# Patient Record
Sex: MALE | Race: WHITE | NOT HISPANIC OR LATINO | Employment: OTHER | URBAN - METROPOLITAN AREA
[De-identification: names, ages, dates, MRNs, and addresses within clinical notes are randomized per-mention and may not be internally consistent; named-entity substitution may affect disease eponyms.]

---

## 2018-12-21 PROBLEM — M48.07 LUMBOSACRAL STENOSIS: Status: ACTIVE | Noted: 2018-12-21

## 2019-05-02 PROBLEM — F17.200 TOBACCO USE DISORDER: Status: ACTIVE | Noted: 2019-05-02

## 2019-05-02 PROBLEM — M54.16 LUMBAR RADICULOPATHY: Status: ACTIVE | Noted: 2019-05-02

## 2019-12-04 PROBLEM — Z12.5 SCREENING FOR PROSTATE CANCER: Status: ACTIVE | Noted: 2019-12-04

## 2019-12-04 PROBLEM — Z00.00 WELL ADULT EXAM: Status: ACTIVE | Noted: 2019-12-04

## 2019-12-04 PROBLEM — Z79.899 ENCOUNTER FOR LONG-TERM (CURRENT) USE OF MEDICATIONS: Status: ACTIVE | Noted: 2019-12-04

## 2020-03-09 PROBLEM — Z00.00 WELL ADULT EXAM: Status: RESOLVED | Noted: 2019-12-04 | Resolved: 2020-03-09

## 2020-05-04 PROBLEM — E78.1 ABNORMALLY LOW HIGH DENSITY LIPOPROTEIN (HDL) CHOLESTEROL WITH HYPERTRIGLYCERIDEMIA: Status: ACTIVE | Noted: 2020-05-04

## 2020-05-04 PROBLEM — E78.6 ABNORMALLY LOW HIGH DENSITY LIPOPROTEIN (HDL) CHOLESTEROL WITH HYPERTRIGLYCERIDEMIA: Status: ACTIVE | Noted: 2020-05-04

## 2020-05-04 PROBLEM — Z00.00 WELL ADULT EXAM: Status: ACTIVE | Noted: 2020-05-04

## 2020-08-03 PROBLEM — Z00.00 WELL ADULT EXAM: Status: RESOLVED | Noted: 2020-05-04 | Resolved: 2020-08-03

## 2020-09-16 PROBLEM — M54.41 CHRONIC RIGHT-SIDED LOW BACK PAIN WITH RIGHT-SIDED SCIATICA: Status: ACTIVE | Noted: 2020-09-16

## 2020-09-16 PROBLEM — G89.29 CHRONIC RIGHT-SIDED LOW BACK PAIN WITH RIGHT-SIDED SCIATICA: Status: ACTIVE | Noted: 2020-09-16

## 2020-10-07 PROBLEM — Z82.49 FAMILY HISTORY OF HEART DISEASE: Status: ACTIVE | Noted: 2020-10-07

## 2020-10-07 PROBLEM — I21.11 STEMI INVOLVING RIGHT CORONARY ARTERY: Status: ACTIVE | Noted: 2020-10-07

## 2020-10-07 PROBLEM — R07.9 CHEST PAIN: Status: ACTIVE | Noted: 2020-10-07

## 2020-10-10 PROBLEM — I21.11 ST ELEVATION MYOCARDIAL INFARCTION INVOLVING RIGHT CORONARY ARTERY: Status: ACTIVE | Noted: 2020-10-10

## 2020-10-10 PROBLEM — R07.9 CHEST PAIN: Status: RESOLVED | Noted: 2020-10-07 | Resolved: 2020-10-10

## 2020-10-10 PROBLEM — I25.10 CORONARY ARTERY DISEASE DUE TO CALCIFIED CORONARY LESION: Status: ACTIVE | Noted: 2020-10-10

## 2020-10-10 PROBLEM — I25.84 CORONARY ARTERY DISEASE DUE TO CALCIFIED CORONARY LESION: Status: ACTIVE | Noted: 2020-10-10

## 2020-10-10 PROBLEM — Z95.5 S/P RIGHT CORONARY ARTERY (RCA) STENT PLACEMENT: Status: ACTIVE | Noted: 2020-10-10

## 2020-10-11 PROBLEM — I21.11 ST ELEVATION MYOCARDIAL INFARCTION INVOLVING RIGHT CORONARY ARTERY: Status: RESOLVED | Noted: 2020-10-10 | Resolved: 2020-10-11

## 2021-01-20 ENCOUNTER — IMMUNIZATION (OUTPATIENT)
Dept: FAMILY MEDICINE | Facility: CLINIC | Age: 71
End: 2021-01-20
Payer: MEDICARE

## 2021-01-20 DIAGNOSIS — Z23 NEED FOR VACCINATION: Primary | ICD-10-CM

## 2021-01-20 PROCEDURE — 91300 COVID-19, MRNA, LNP-S, PF, 30 MCG/0.3 ML DOSE VACCINE: CPT | Mod: PBBFAC,PO

## 2021-02-10 ENCOUNTER — IMMUNIZATION (OUTPATIENT)
Dept: FAMILY MEDICINE | Facility: CLINIC | Age: 71
End: 2021-02-10
Payer: MEDICARE

## 2021-02-10 DIAGNOSIS — Z23 NEED FOR VACCINATION: Primary | ICD-10-CM

## 2021-02-10 PROCEDURE — 91300 COVID-19, MRNA, LNP-S, PF, 30 MCG/0.3 ML DOSE VACCINE: CPT | Mod: PBBFAC,PO

## 2021-02-10 PROCEDURE — 0002A COVID-19, MRNA, LNP-S, PF, 30 MCG/0.3 ML DOSE VACCINE: CPT | Mod: PBBFAC,PO

## 2021-02-24 PROBLEM — E78.00 HYPERCHOLESTEROLEMIA: Status: ACTIVE | Noted: 2021-02-24

## 2021-03-23 PROBLEM — I25.2 HISTORY OF NON-ST ELEVATION MYOCARDIAL INFARCTION (NSTEMI): Status: ACTIVE | Noted: 2021-03-23

## 2021-03-23 PROBLEM — N52.01 ERECTILE DYSFUNCTION DUE TO ARTERIAL INSUFFICIENCY: Status: ACTIVE | Noted: 2021-03-23

## 2021-03-23 PROBLEM — E78.2 MIXED HYPERLIPIDEMIA: Status: ACTIVE | Noted: 2021-03-23

## 2021-09-30 ENCOUNTER — IMMUNIZATION (OUTPATIENT)
Dept: FAMILY MEDICINE | Facility: CLINIC | Age: 71
End: 2021-09-30
Payer: MEDICARE

## 2021-09-30 DIAGNOSIS — Z23 NEED FOR VACCINATION: Primary | ICD-10-CM

## 2021-09-30 PROCEDURE — 91300 COVID-19, MRNA, LNP-S, PF, 30 MCG/0.3 ML DOSE VACCINE: CPT | Mod: PBBFAC | Performed by: FAMILY MEDICINE

## 2021-09-30 PROCEDURE — 0003A COVID-19, MRNA, LNP-S, PF, 30 MCG/0.3 ML DOSE VACCINE: CPT | Mod: PBBFAC | Performed by: FAMILY MEDICINE

## 2021-10-29 ENCOUNTER — TELEPHONE (OUTPATIENT)
Dept: FAMILY MEDICINE | Facility: CLINIC | Age: 71
End: 2021-10-29
Payer: MEDICARE

## 2021-11-11 PROBLEM — Z00.00 ANNUAL PHYSICAL EXAM: Status: ACTIVE | Noted: 2021-11-11

## 2022-02-14 PROBLEM — Z00.00 ANNUAL PHYSICAL EXAM: Status: RESOLVED | Noted: 2021-11-11 | Resolved: 2022-02-14

## 2022-04-18 ENCOUNTER — IMMUNIZATION (OUTPATIENT)
Dept: FAMILY MEDICINE | Facility: CLINIC | Age: 72
End: 2022-04-18
Payer: MEDICARE

## 2022-04-18 DIAGNOSIS — Z23 NEED FOR VACCINATION: Primary | ICD-10-CM

## 2022-11-16 ENCOUNTER — IMMUNIZATION (OUTPATIENT)
Dept: FAMILY MEDICINE | Facility: CLINIC | Age: 72
End: 2022-11-16
Payer: MEDICARE

## 2022-11-16 DIAGNOSIS — Z23 NEED FOR VACCINATION: Primary | ICD-10-CM

## 2022-11-16 PROCEDURE — 91312 COVID-19, MRNA, LNP-S, BIVALENT BOOSTER, PF, 30 MCG/0.3 ML DOSE: CPT | Mod: S$GLB,,, | Performed by: FAMILY MEDICINE

## 2022-11-16 PROCEDURE — 91312 COVID-19, MRNA, LNP-S, BIVALENT BOOSTER, PF, 30 MCG/0.3 ML DOSE: ICD-10-PCS | Mod: S$GLB,,, | Performed by: FAMILY MEDICINE

## 2022-11-16 PROCEDURE — 0124A COVID-19, MRNA, LNP-S, BIVALENT BOOSTER, PF, 30 MCG/0.3 ML DOSE: CPT | Mod: PBBFAC | Performed by: FAMILY MEDICINE

## 2023-02-27 PROBLEM — Z00.00 ANNUAL PHYSICAL EXAM: Status: RESOLVED | Noted: 2021-11-11 | Resolved: 2023-02-27

## 2023-12-03 PROBLEM — Z95.5 S/P RIGHT CORONARY ARTERY (RCA) STENT PLACEMENT: Status: RESOLVED | Noted: 2020-10-10 | Resolved: 2023-12-03

## 2023-12-03 PROBLEM — I25.2 HISTORY OF NON-ST ELEVATION MYOCARDIAL INFARCTION (NSTEMI): Status: RESOLVED | Noted: 2021-03-23 | Resolved: 2023-12-03

## 2023-12-03 PROBLEM — E78.2 MIXED HYPERLIPIDEMIA: Status: RESOLVED | Noted: 2021-03-23 | Resolved: 2023-12-03

## 2023-12-03 PROBLEM — I25.84 CORONARY ARTERY DISEASE DUE TO CALCIFIED CORONARY LESION: Status: RESOLVED | Noted: 2020-10-10 | Resolved: 2023-12-03

## 2023-12-03 PROBLEM — I25.10 CORONARY ARTERY DISEASE DUE TO CALCIFIED CORONARY LESION: Status: RESOLVED | Noted: 2020-10-10 | Resolved: 2023-12-03

## 2024-03-04 PROBLEM — Z00.00 ANNUAL PHYSICAL EXAM: Status: RESOLVED | Noted: 2021-11-11 | Resolved: 2024-03-04

## 2024-07-09 PROBLEM — R05.3 CHRONIC COUGH: Status: ACTIVE | Noted: 2024-07-09

## 2024-10-09 ENCOUNTER — OFFICE VISIT (OUTPATIENT)
Dept: NEUROLOGY | Facility: CLINIC | Age: 74
End: 2024-10-09
Payer: MEDICARE

## 2024-10-09 VITALS — DIASTOLIC BLOOD PRESSURE: 82 MMHG | HEART RATE: 73 BPM | SYSTOLIC BLOOD PRESSURE: 143 MMHG

## 2024-10-09 DIAGNOSIS — R20.0 NUMBNESS: Primary | ICD-10-CM

## 2024-10-09 DIAGNOSIS — G62.9 NEUROPATHY: ICD-10-CM

## 2024-10-09 DIAGNOSIS — R74.8 ABNORMAL LEVELS OF OTHER SERUM ENZYMES: ICD-10-CM

## 2024-10-09 PROCEDURE — 3079F DIAST BP 80-89 MM HG: CPT | Mod: CPTII,S$GLB,, | Performed by: NURSE PRACTITIONER

## 2024-10-09 PROCEDURE — 99999 PR PBB SHADOW E&M-EST. PATIENT-LVL III: CPT | Mod: PBBFAC,,, | Performed by: NURSE PRACTITIONER

## 2024-10-09 PROCEDURE — 1160F RVW MEDS BY RX/DR IN RCRD: CPT | Mod: CPTII,S$GLB,, | Performed by: NURSE PRACTITIONER

## 2024-10-09 PROCEDURE — 1159F MED LIST DOCD IN RCRD: CPT | Mod: CPTII,S$GLB,, | Performed by: NURSE PRACTITIONER

## 2024-10-09 PROCEDURE — 99203 OFFICE O/P NEW LOW 30 MIN: CPT | Mod: S$GLB,,, | Performed by: NURSE PRACTITIONER

## 2024-10-09 PROCEDURE — 3077F SYST BP >= 140 MM HG: CPT | Mod: CPTII,S$GLB,, | Performed by: NURSE PRACTITIONER

## 2024-10-09 PROCEDURE — 1125F AMNT PAIN NOTED PAIN PRSNT: CPT | Mod: CPTII,S$GLB,, | Performed by: NURSE PRACTITIONER

## 2024-10-09 NOTE — PROGRESS NOTES
NEUROLOGY  Outpatient Consultation Visit     [facility]  [address], [place] [address]  [phone] (office) / [phone] (fax)    Patient Name:  Wagner Heredia  :  1950  MR #:  [id number]  Acct #:  [id number]    Date of Neurology Consult: 10/09/2024  Name of Provider: JOSE D Slater    Other Physicians:  Shawn Ross DO (Primary Care Physician); Shawn Ross DO (Referring)      Chief Complaint: Peripheral Neuropathy (BLE)      History of Present Illness (HPI):  Wagner Heredia is a left handed  74 y.o. male with a PMHX of h/o cervical fracture, right hand fx      Patient presents today for neuropathy. It is to both feet.     He fractured his cervical spine in  and has 3 lumbar surgeries. He has chronic left calf atrophy. About 3-4 years ago he began having itching sensation to the balls of both feet. This did resolve overtime after lumbar surgeries.   Currently he has numbness to the tops of both feet. This began about 1 year ago. He describes it as altered sensation. This is constant but varies in intensity.  He denies associated pain and the symptoms do not wake him up at night. Exacerbating factors includes wearing socks. There are no alleviating factors.     He denies a history diabetes, autoimmune disorders, viral/bacterial infections, cancer, bone marrow disorders, kidney/liver/thyroid disease. H denies exposure to toxic substances or poisons. He does have chronic neck and back pain but no specific MVA or falls.     He drinks 1 shot of whiskey once or twice per week. He does admit to drinking daily in his 20s but no longer does. He does smoke a pipe but does not inhale.                 Past Medical, Surgical, Family & Social History:   Past Medical History:   Diagnosis Date    H/O cervical fracture     c5,c6    Right hand fracture      Past Surgical History:   Procedure Laterality Date    BACK SURGERY      fracture C5 and C6     CORONARY ANGIOGRAPHY N/A 10/7/2020    Procedure: ANGIOGRAM, CORONARY ARTERY;  Surgeon: Alejandro Luu MD;  Location: [facility] CATH;  Service: Cardiovascular;  Laterality: N/A;    CORONARY ANGIOGRAPHY N/A 10/8/2020    Procedure: ANGIOGRAM, CORONARY ARTERY;  Surgeon: Alejandro Luu MD;  Location: [facility] CATH;  Service: Cardiovascular;  Laterality: N/A;    CORONARY ANGIOGRAPHY N/A 2/24/2021    Procedure: ANGIOGRAM, CORONARY ARTERY;  Surgeon: Alejandro Luu MD;  Location: [facility] CATH;  Service: Cardiovascular;  Laterality: N/A;    DECOMPRESSION OF LUMBAR SPINE USING MINIMALLY INVASIVE TECHNIQUE N/A 5/2/2019    Procedure: DECOMPRESSION, SPINE, LUMBAR, MINIMALLY INVASIVE L2-3; dura repair;  Surgeon: Jose Luis Dee MD;  Location: [facility] OR;  Service: Orthopedics;  Laterality: N/A;    INSERTION OF INTRA-AORTIC BALLOON ASSIST DEVICE  10/8/2020    Procedure: INSERTION, INTRA-AORTIC BALLOON PUMP;  Surgeon: Alejandro Luu MD;  [facility];  Service: Cardiovascular;;    INSERTION OF TEMPORARY PACEMAKER N/A 10/8/2020    Procedure: INSERTION, PACEMAKER, TEMPORARY;  Surgeon: Alejandro Luu MD;  [facility];  Service: Cardiovascular;  Laterality: N/A;    LAMINECTOMY USING MINIMALLY INVASIVE TECHNIQUE N/A 12/21/2018    Procedure: LAMINECTOMY, SPINE, MINIMALLY INVASIVE- L2-5;  Surgeon: Jose Luis Dee MD;  Location: [facility] OR;  Service: Orthopedics;  Laterality: N/A;    LEFT HEART CATHETERIZATION Left 10/9/2020    Procedure: Left heart cath - Relook Angio with removal of IABP.;  Surgeon: Alejandro Luu MD;  Location: [facility] CATH;  Service: Cardiovascular;  Laterality: Left;    LEFT HEART CATHETERIZATION Left 10/7/2020    Procedure: CATHETERIZATION, HEART, LEFT;  Surgeon: Alejandro Luu MD;  Location: [facility] CATH;  Service: Cardiovascular;  Laterality: Left;    NECK SURGERY      fractured verterae    PERCUTANEOUS TRANSLUMINAL BALLOON ANGIOPLASTY OF CORONARY ARTERY  10/8/2020    Procedure: Angioplasty-coronary;  Surgeon:  Alejandro Luu MD;  Location: [facility];  Service: Cardiovascular;;     Family History   Problem Relation Name Age of Onset    Heart disease Father      Thyroid disease Mother       Alcohol use:  reports current alcohol use.   (Of note, 0.6 oz = 1 beer or 6 oz = 10 beers).  Tobacco use:  reports that he has quit smoking. His smoking use included pipe. He has never used smokeless tobacco.  Street drug use:  reports no history of drug use.  Allergies: Codeine, Hydrocodone, and Penicillins.    Home Medications:     Current Outpatient Medications:     ascorbic acid, vitamin C, (VITAMIN C) 1000 MG tablet, Take 1,000 mg by mouth once daily. , Disp: , Rfl:     atorvastatin (LIPITOR) 80 MG tablet, Take 1 tablet (80 mg total) by mouth every evening., Disp: 90 tablet, Rfl: 4    GLUCOSA COULTER 2KCL/CHONDROITIN COULTER (GLUCOSAMINE-CHONDROITIN DS ORAL), Take 1,000 mg by mouth once daily. Hold per MD instructions, Disp: , Rfl:     multivitamin (THERAGRAN) per tablet, Take 1 tablet by mouth once daily. Hold morning of surgery, Disp: , Rfl:     prasugreL (EFFIENT) 10 mg Tab, TAKE 1 TABLET BY MOUTH DAILY AS DIRECTED (Patient taking differently: 5mg daily), Disp: 90 tablet, Rfl: 3    saw palmetto 160 MG capsule, Take 160 mg by mouth 2 (two) times daily. Hold per MD instructions, Disp: , Rfl:     UNABLE TO FIND, Medical Marijuana, Disp: , Rfl:     vitamin D (VITAMIN D3) 1000 units Tab, Take 5,000 Units by mouth., Disp: , Rfl:     Physical Examination:  BP (!) 143/82 (BP Location: Right arm, Patient Position: Sitting)   Pulse 73     GENERAL:  General appearance: Well, non-toxic appearing.  No apparent distress.  Neck: supple.    MENTAL STATUS:  Alertness, attention span & concentration: normal.  Language: normal.  Orientation to self, place & time:  normal.  Memory, recent & remote: normal.  Fund of knowledge: normal.      SPEECH:  Clear and fluent.  Follows complex commands.      CRANIAL NERVES:  Cranial Nerves II-XII were examined.  II -  Visual fields: normal.  III, IV, VI: PERRL, EOMI, No ptosis, No nystagmus.  V - Facial sensation: normal.  VII - Face symmetry & mobility: normal.  VIII - Hearing: normal  IX, X - Palate: mobile & midline.  XI - Shoulder shrug: normal.  XII - Tongue protrusion: normal.        GROSS MOTOR:  Gait & station: able to rise from chair with arms crossed over chest; antalgic gait with slight left sided limp  Tone: normal.  Abnormal movements: none.  Finger-nose: normal.  Rapid alternating movements: normal.  Pronator drift: normal      MUSCLE STRENGTH:   Hand grasp:   - right:5/5   - left:5/5    RIGHT    LEFT   5 Deltoids 5   5 Biceps 5   5 Triceps 5   5 Forearm.Pr. 5        5 Iliopsoas flex    5   5 Hip Abduct 5   5 Hip Adduct 5   5 Quads 5   5 Hams 5   5 Dorsiflex 5   5 Plantar Flex 5   Atrophy to LLE (chronic)    REFLEXES:    RIGHT Reflex   LEFT   2 Biceps 2   2 Brachiorad. 2        1+ Patellar 1+         SENSORY:  Light touch: Normal throughout.  Sharp touch: dul sensation to dorsum both feet  Vibration: decreased to bilateral pinky toes  Temperature: decreased to tops and bottoms of both feet   Joint Position: Normal throughout.  Proprioception: Intact      Diagnostic Data Reviewed:   I have personally reviewed provider notes, labs and imaging made available to me today.     Imaging:  N/a      Labs:  Lab Results   Component Value Date    WBC 8.17 05/11/2022    HGB 15.9 05/11/2022    HCT 48.1 05/11/2022     05/11/2022    MCV 97 05/11/2022    RDW 12.4 05/11/2022     Lab Results   Component Value Date     11/28/2023    K 4.6 11/28/2023     11/28/2023    CO2 28 11/28/2023    BUN 12 11/28/2023    CREATININE 1.11 11/28/2023     (H) 11/28/2023    CALCIUM 8.9 11/28/2023    MG 2.1 10/10/2020     Lab Results   Component Value Date    PROT 6.9 11/28/2023    ALBUMIN 3.9 11/28/2023    BILITOT 1.0 11/28/2023    AST 45 11/28/2023    ALKPHOS 108 11/28/2023    ALT 40 11/28/2023     Lab Results   Component  "Value Date    INR 1.3 10/08/2020     Lab Results   Component Value Date    CHOL 109 (L) 11/28/2023    HDL 40 11/28/2023    LDLCALC 51.6 (L) 11/28/2023    TRIG 87 11/28/2023    CHOLHDL 36.7 11/28/2023     Lab Results   Component Value Date    HGBA1C 5.4 12/04/2019      No results found for: "MKLGCBIE04"  No results found for: "FOLATE"  Lab Results   Component Value Date    TSH 1.460 10/04/2016     No results found for: "VITAMINB1"  Lab Results   Component Value Date    RPR Non-Reactive 04/11/2006     No results found for: "ROCKY"  No components found for: "HEPATITISCANTIBODY"  No components found for: "HIV 1/2 AG/AB"  No results found for: "CRP"  No components found for: "SEDIMENTATIONRATE"      Assessment and Plan:  Wagner Heredia is a 74 y.o. male.    Problem List Items Addressed This Visit          Neuro    Numbness - Primary    Current Assessment & Plan     Initially pt reported neuropathy-like symptoms to both feet (burning, tingling, numbness) ~ 3-4 years ago that resolved after having lumbar surgeries.   Currently, he reports numbness to dorsum of both feet that has been ongoing for the last year.   No associated pain  Neuro exam with intact strength; decreased pin prick to bilateral dorsum of foot. Proprioception intact  Etiology unclear  Obtain serologies and EMG/NCS  Since no associated pain, medication is not warranted. Pt does use essential oils.          Other Visit Diagnoses       Neuropathy        Abnormal levels of other serum enzymes                                Important to note, also  has a past medical history of H/O cervical fracture and Right hand fracture.            The patient will return to clinic in 1-2 months after testing        All questions were answered and patient is comfortable with the plan.       Thank you very much for the opportunity to assist in this patient's care.    If you have any questions or concerns, please do not hesitate to contact me at any time.    Sincerely, "     JOSE D Slater  [facility] [place] I spent a total of 44 minutes on the day of the visit.This includes face to face time and non-face to face time preparing to see the patient (eg, review of tests), Obtaining and/or reviewing separately obtained history, Documenting clinical information in the electronic or other health record, Independently interpreting resultsand communicating results to the patient/family/caregiver, or Care coordination.

## 2024-10-09 NOTE — ASSESSMENT & PLAN NOTE
Initially pt reported neuropathy-like symptoms to both feet (burning, tingling, numbness) ~ 3-4 years ago that resolved after having lumbar surgeries.   Currently, he reports numbness to dorsum of both feet that has been ongoing for the last year.   No associated pain  Neuro exam with intact strength; decreased pin prick to bilateral dorsum of foot. Proprioception intact  Etiology unclear  Obtain serologies and EMG/NCS  Since no associated pain, medication is not warranted. Pt does use essential oils.

## 2024-10-11 ENCOUNTER — IMMUNIZATION (OUTPATIENT)
Dept: FAMILY MEDICINE | Facility: CLINIC | Age: 74
End: 2024-10-11
Payer: MEDICARE

## 2024-10-11 DIAGNOSIS — Z23 NEED FOR VACCINATION: Primary | ICD-10-CM

## 2024-12-02 ENCOUNTER — PROCEDURE VISIT (OUTPATIENT)
Dept: NEUROLOGY | Facility: CLINIC | Age: 74
End: 2024-12-02
Payer: MEDICARE

## 2024-12-02 DIAGNOSIS — R20.0 NUMBNESS: ICD-10-CM

## 2024-12-02 DIAGNOSIS — G62.9 NEUROPATHY: ICD-10-CM

## 2024-12-02 DIAGNOSIS — M54.17 LUMBOSACRAL RADICULOPATHY: Primary | ICD-10-CM

## 2024-12-02 PROCEDURE — 95910 NRV CNDJ TEST 7-8 STUDIES: CPT | Mod: S$GLB,,, | Performed by: PSYCHIATRY & NEUROLOGY

## 2024-12-02 PROCEDURE — 95886 MUSC TEST DONE W/N TEST COMP: CPT | Mod: S$GLB,,, | Performed by: PSYCHIATRY & NEUROLOGY

## 2024-12-06 ENCOUNTER — TELEPHONE (OUTPATIENT)
Dept: NEUROLOGY | Facility: CLINIC | Age: 74
End: 2024-12-06
Payer: MEDICARE

## 2024-12-06 NOTE — TELEPHONE ENCOUNTER
Kandy Ruano, FNP  P [facility] Staff  Please call patient and let him know his vitamin B6 is very elevated. This can cause similar symptoms to that he's complaining of. If taking a vitamin with B6 in it I recommend he stop it for now and then can eventually continue it at every other day.

## 2024-12-09 PROBLEM — Z00.00 ANNUAL WELLNESS VISIT: Status: ACTIVE | Noted: 2024-12-09

## 2025-01-10 ENCOUNTER — TELEPHONE (OUTPATIENT)
Dept: NEUROLOGY | Facility: CLINIC | Age: 75
End: 2025-01-10
Payer: MEDICARE

## 2025-01-10 PROBLEM — R05.3 CHRONIC COUGH: Status: ACTIVE | Noted: 2025-01-10

## 2025-01-10 PROBLEM — J30.1 NON-SEASONAL ALLERGIC RHINITIS DUE TO POLLEN: Status: ACTIVE | Noted: 2025-01-10

## 2025-01-10 NOTE — TELEPHONE ENCOUNTER
Spoke with the pt, requests EMG results be uploaded for interpretation secure chat sent to Dr Elizabeth.

## 2025-01-10 NOTE — PROCEDURES
[facility]  [address]  [place] [address]  [phone]      Full Name: Wagner Heredia Gender: Male  Patient ID: [id number] YOB: 1950      Visit Date: 12/2/2024 3:05 PM  Age: 74 Years  Examining Physician: Shannon Elizabeth D.O., ABPN, AOBNP, ABEM   Referring Physician: CARRIE Brink   Technologist: NABIL Cook   Height: 5 feet 10 inch  History: Patient complains of numbness on the dorsum of both feet that travels up the lower legs.  He has low back pain that can travel down the left leg.  He has atrophy of the left calf muscle since a previous back surgery.  He had a minimally invasive laminectomy from L2-5 and minimally invasive decompression at L2-3 with meningocele repair.  He has been referred for an EMG of both lower extremities.  He is noted to be taking prasugrel 5 mg daily.      Sensory NCS      Nerve / Sites Rec. Site Onset Lat Peak Lat NP Amp Segments Distance Velocity Temp.     ms ms µV  cm m/s °C   L Sural - Ankle (Calf)      Calf Ankle 3.65 4.56 4.4 Calf - Ankle 14 38 30.9      Ref.   <=4.60 >=3.0 Ref.  >=40    R Sural - Ankle (Calf)      Calf Ankle 3.48 4.40 3.8 Calf - Ankle 14 40 30.9      Ref.   <=4.60 >=3.0 Ref.  >=40    L Superficial peroneal - Ankle      Lat leg Ankle 3.65 4.46 3.7 Lat leg - Ankle 12 33 30.9      Ref.   <=4.60 >=3.0 Ref.      R Superficial peroneal - Ankle      Lat leg Ankle 3.04 3.58 3.1 Lat leg - Ankle 12 39 30.9      Ref.   <=4.60 >=3.0 Ref.          Motor NCS      Nerve / Sites Muscle Latency Ref. Amplitude Ref. Amp % Duration Segments Distance Lat Diff Velocity Ref. Temp.     ms ms mV mV % ms  cm ms m/s m/s °C   L Peroneal - EDB      Ankle EDB 5.23 <=6.00 2.7 >=2.5 100 5.83 Ankle - EDB     30.9      Fib head EDB 12.73  2.2  81.3 6.04 Fib head - Ankle 31 7.50 41 >=40 30.9      Pop fossa EDB 15.21  2.2  82.3 5.94 Pop fossa - Fib head 10 2.48 40  30.9   R Peroneal - EDB       Ankle EDB 4.90 <=6.00 3.8 >=2.5 100 5.19 Ankle - EDB     30.9      Fib head EDB 11.65  3.3  86.6 5.81 Fib head - Ankle 28 6.75 41 >=40 30.9      Pop fossa EDB 14.15  3.2  84 6.00 Pop fossa - Fib head 10 2.50 40  30.9   L Tibial - AH      Ankle AH 3.83 <=6.00 4.0 >=4.0 100 6.52 Ankle - AH     30.9      Pop fossa AH 13.83  3.6  90.1  Pop fossa - Ankle 40 10.00 40 >=40 30.9   R Tibial - AH      Ankle AH 4.48 <=6.00 4.3 >=4.0 100 5.33 Ankle - AH     30.9      Pop fossa AH 14.08  2.4  55.9  Pop fossa - Ankle 38 9.60 40 >=40 30.9       F  Wave      Nerve Fmin Ref.    ms ms   L Peroneal - EDB 55.94 <=56.00   L Tibial - AH 58.28 <=56.00   R Peroneal - EDB 53.44 <=56.00   R Tibial - AH 57.97 <=56.00       EMG Summary Table     Spontaneous Recruitment Activation Duration Amplitude Polyphasia Comment   Muscle Ins Act Fib Fasc Pattern - - - - -   L. Tibialis anterior Normal 0 0 Sl Dec Normal N/+1 N/+1 N/+1 Normal   L. Gastrocnemius (Medial head) Inc +2 0 Mod Dec Normal +1 +1 +1 Normal   L. Extensor hallucis longus Normal 0 0 Sl Dec Normal +1 N/+1 N/+1 Normal   L. Flexor digitorum longus Normal 0 0 Mod Dec Normal +1 +1 +1 Normal   L. Vastus medialis Normal 0 0 Sl Dec Normal +1 +1 N/+1 Normal   L. Tensor fasciae latae Normal 0 0 Sl Dec Normal N/+1 N/+1 N/+1 Normal   L. Biceps femoris (short head) Normal 0 0 Sl Dec Normal +2 +2 +1 Normal   R. Tibialis anterior Normal 0 0 Sl Dec Normal Normal N/+1 N/+1 Normal   R. Gastrocnemius (Medial head) Normal 0 0 Mod Dec Normal +1 +1 N/+1 Normal   R. Extensor hallucis longus Normal 0 0 Sl Dec Normal Normal N/+1 N/+1 Normal   R. Flexor digitorum longus Normal 0 0 Mod Dec Normal +1 +1 N/+1 Normal   R. Vastus medialis Normal 0 0 Sl Dec Normal +1 +1 +1 Normal   R. Tensor fasciae latae Normal 0 0 Sl Dec Normal Normal N/+1 N/+1 Normal   R. Biceps femoris (short head) Normal 0 0 Sl Dec Normal +1 +1 N/+1 Normal         Wagner Heredia [id number] 12/2/2024 3:05 PM     4 of 4    Summary: Nerve conduction  studies were performed on both lower extremities.  Bilateral sural and superficial peroneal sensory responses were normal in amplitude and latency.  Bilateral peroneal and tibial motor studies were normal in amplitude, latency and velocity.  Bilateral peroneal minimal F-wave latencies were normal.  Bilateral tibial minimal F-wave latencies were slightly prolonged.  Needle EMG was performed in both lower extremities.  The paraspinal muscles were deferred due to surgery history.  Active denervation was present in the left medial gastrocnemius muscle.  Motor units were large,polyphasic and at times long with reduced recruitment in every muscle tested.      Impression: This is an abnormal EMG of both lower extremities.  There is evidence of the following:  Chronic, moderate severity, left lumbosacral polyradiculopathy affecting the L4, L5 and S1 nerve roots with active denervation present in the S1 distribution.    Chronic, moderate severity, right lumbosacral polyradiculopathy affecting the L4, L5 and S1 nerve roots without active denervation.  There is no evidence of any focal neuropathy, plexopathy or peripheral neuropathy on this study.    Thank you for referring to the [facility] in [place]. Please feel free to contact the clinic if you have any further questions regarding this study or report.       _____________________________  Shannon Elizabeth D.O., ABPN, AOBNP, RONDA Heredia [id number] 12/2/2024 3:05 PM     4 of 4

## 2025-01-10 NOTE — TELEPHONE ENCOUNTER
----- Message from Manuela sent at 1/10/2025 11:21 AM CST -----  Regarding: results  Contact: patient  Type:  Test Results    Who Called:  patient  Name of Test (Lab/Mammo/Etc):  nerve conduction  Date of Test:    Ordering Provider:  Shannon Elizabeth NP  Where the [facility] performed[facility]  Best Call Back Number: [phone]    Additional Information:  Please call patient to advise.  Thanks!

## 2025-01-13 ENCOUNTER — TELEPHONE (OUTPATIENT)
Dept: NEUROLOGY | Facility: CLINIC | Age: 75
End: 2025-01-13
Payer: MEDICARE

## 2025-01-13 NOTE — TELEPHONE ENCOUNTER
----- Message from CARRIE Slater sent at 1/13/2025  9:48 AM CST -----  Please call the patient and inform him his nerve conduction study did not report neuropathy but it did report significant radiculopathy. This is a term used for nerve impingement. It coincides with nerve pinching from his lumbar spine that is associated with his legs/feet. If there is no pain associated then he can continue using oils/creams. If having pain to back/feet then id recommend he see pain management.